# Patient Record
Sex: FEMALE | Race: WHITE
[De-identification: names, ages, dates, MRNs, and addresses within clinical notes are randomized per-mention and may not be internally consistent; named-entity substitution may affect disease eponyms.]

---

## 2022-01-13 ENCOUNTER — HOSPITAL ENCOUNTER (EMERGENCY)
Dept: HOSPITAL 46 - ED | Age: 87
Discharge: HOME | End: 2022-01-13
Payer: COMMERCIAL

## 2022-01-13 VITALS — WEIGHT: 144.01 LBS | HEIGHT: 62 IN | BODY MASS INDEX: 26.5 KG/M2
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
## 2022-01-13 DIAGNOSIS — F03.90: ICD-10-CM

## 2022-01-13 DIAGNOSIS — S00.83XA: Primary | ICD-10-CM

## 2022-01-13 DIAGNOSIS — I50.9: ICD-10-CM

## 2022-01-13 DIAGNOSIS — Z88.8: ICD-10-CM

## 2022-01-13 DIAGNOSIS — Z88.5: ICD-10-CM

## 2022-01-13 DIAGNOSIS — Z79.899: ICD-10-CM

## 2022-01-27 ENCOUNTER — HOSPITAL ENCOUNTER (EMERGENCY)
Dept: HOSPITAL 46 - ED | Age: 87
Discharge: HOME | End: 2022-01-27
Payer: COMMERCIAL

## 2022-01-27 VITALS — BODY MASS INDEX: 21.16 KG/M2 | HEIGHT: 62 IN | WEIGHT: 114.99 LBS

## 2022-01-27 DIAGNOSIS — E07.9: ICD-10-CM

## 2022-01-27 DIAGNOSIS — U07.1: Primary | ICD-10-CM

## 2022-01-27 DIAGNOSIS — Z79.890: ICD-10-CM

## 2022-01-27 DIAGNOSIS — N39.0: ICD-10-CM

## 2022-01-27 DIAGNOSIS — Z88.8: ICD-10-CM

## 2022-01-27 DIAGNOSIS — I50.9: ICD-10-CM

## 2022-01-27 DIAGNOSIS — Z88.5: ICD-10-CM

## 2022-01-27 DIAGNOSIS — Z79.899: ICD-10-CM

## 2022-01-27 PROCEDURE — C9803 HOPD COVID-19 SPEC COLLECT: HCPCS

## 2022-01-27 PROCEDURE — U0003 INFECTIOUS AGENT DETECTION BY NUCLEIC ACID (DNA OR RNA); SEVERE ACUTE RESPIRATORY SYNDROME CORONAVIRUS 2 (SARS-COV-2) (CORONAVIRUS DISEASE [COVID-19]), AMPLIFIED PROBE TECHNIQUE, MAKING USE OF HIGH THROUGHPUT TECHNOLOGIES AS DESCRIBED BY CMS-2020-01-R: HCPCS

## 2022-01-27 PROCEDURE — A9270 NON-COVERED ITEM OR SERVICE: HCPCS

## 2022-01-27 NOTE — XMS
PreManage Notification: EZRA JEFFERSON MRN:[id number]
 
Security Information
 
Security Events
No recent Security Events currently on file
 
 
 
CRITERIA MET
------------
- [facility] - 2 Visits in 30 Days
 
 
CARE PROVIDERS
There are no care providers on record at this time.
 
[facility] has no Care Guidelines for this patient.
 
[facility] VISIT COUNT (12 MO.)
-------------------------------------------------------------------------------------
[address] [facility]
-------------------------------------------------------------------------------------
TOTAL 2
-------------------------------------------------------------------------------------
NOTE: Visits indicate total known visits.
 
ED/C VISIT TRACKING (12 MO.)
-------------------------------------------------------------------------------------
01/27/2022 10:02
[facility] OR
 
TYPE: Emergency
 
COMPLAINT:
- WEAKNESS
-------------------------------------------------------------------------------------
01/13/2022 00:04
[facility]
 
TYPE: Emergency
 
COMPLAINT:
- HEAD INJ/ FALL
 
DIAGNOSES:
- Allergy status to narcotic agent
- Heart failure, unspecified
- Allergy status to other drugs, medicaments and biological substances
- Contusion of other part of head, initial encounter
- Fall on same level from slipping, tripping and stumbling with subsequent striking
against unspecified object, initial encounter
- Unspecified dementia without behavioral disturbance
- Other long term (current) drug therapy
-------------------------------------------------------------------------------------
 
 
INPATIENT VISIT TRACKING (12 MO.)
No inpatient visits to display in this time frame
 
 
[email or web address]